# Patient Record
Sex: FEMALE | URBAN - METROPOLITAN AREA
[De-identification: names, ages, dates, MRNs, and addresses within clinical notes are randomized per-mention and may not be internally consistent; named-entity substitution may affect disease eponyms.]

---

## 2022-01-22 ENCOUNTER — NURSE TRIAGE (OUTPATIENT)
Dept: ADMINISTRATIVE | Facility: CLINIC | Age: 47
End: 2022-01-22

## 2022-01-23 NOTE — TELEPHONE ENCOUNTER
[place] patient    Spoke with Tiffany    Patient fell earlier today and injured her lower back. Patient's pain is consistent 10/10 despite taking flexeril. Advised to go to the nearest ED for unbearable pain. Caller MARISSA.     Reason for Disposition   Health Information question, no triage required and triager able to answer question    Protocols used: INFORMATION ONLY CALL - NO TRIAGE-A-